# Patient Record
Sex: FEMALE | Race: AMERICAN INDIAN OR ALASKA NATIVE
[De-identification: names, ages, dates, MRNs, and addresses within clinical notes are randomized per-mention and may not be internally consistent; named-entity substitution may affect disease eponyms.]

---

## 2018-01-12 ENCOUNTER — HOSPITAL ENCOUNTER (OUTPATIENT)
Dept: HOSPITAL 5 - SPVWC | Age: 58
Discharge: HOME | End: 2018-01-12
Attending: OBSTETRICS & GYNECOLOGY
Payer: COMMERCIAL

## 2018-01-12 DIAGNOSIS — R92.8: Primary | ICD-10-CM

## 2018-01-12 NOTE — ULTRASOUND REPORT
LEFT DIGITAL DIAGNOSTIC MAMMOGRAM and LEFT BREAST ULTRASOUND: 01/12/18  

 

 09:03:00  

   

 CLINICAL: Recalled for asymmetry.  

   

 COMPARISON:11/07/17 screening  

   

 FINDINGS: ML, exaggerated CC and spot compression MLO views were   

 performed. Near complete effacement of asymmetry on the CC view. The   

 other views are negative.   

   

 Ultrasound of the upper outer left breast was performed and   

 demonstrated normal fibroglandular and fatty structures.  No mass cyst 

  

 or shadowing.  

   

 IMPRESSION: Negative mammogram and negative left breast ultrasound.  

   

 BI-RADS CATEGORY:  1 -- Negative  

   

 RECOMMENDATION: Routine mammographic screening in one year.

## 2018-01-12 NOTE — MAMMOGRAPHY REPORT
LEFT DIGITAL DIAGNOSTIC MAMMOGRAM and LEFT BREAST ULTRASOUND: 01/12/18 

09:03:00



CLINICAL: Recalled for asymmetry.



COMPARISON:11/07/17 screening



FINDINGS: ML, exaggerated CC and spot compression MLO views were 

performed. Near complete effacement of asymmetry on the CC view. The 

other views are negative. 



Ultrasound of the upper outer left breast was performed and 

demonstrated normal fibroglandular and fatty structures.  No mass cyst 

or shadowing.



IMPRESSION: Negative mammogram and negative left breast ultrasound.



BI-RADS CATEGORY:  1 -- Negative



RECOMMENDATION: Routine mammographic screening in one year.



ACR BI-RADS MAMMOGRAPHIC CODES:

0 = Needs additional imaging evaluation; 1 = Negative; 2 = Benign; 3 = 

Probably benign; 4 = Suspicious; 5 = Malignant; 6 = Known biopsy-proven 

malignancy



COMMENT:

      1.   Dense breast tissue, i.e., adenosis, fibrocystic 

            changes, etc., may obscure an underlying neoplasm.

      2.   Approximately 10% of cancers are not detected with

            mammography.

      3.   A negative mammography report should not delay biopsy 

            if a clinically suspicious mass is present.





COMMENT:

Patient follow-up letters are generated via our [employer] 

application.

## 2021-08-28 ENCOUNTER — TELEPHONE ENCOUNTER (OUTPATIENT)
Dept: URBAN - METROPOLITAN AREA CLINIC 13 | Facility: CLINIC | Age: 61
End: 2021-08-28

## 2021-08-29 ENCOUNTER — TELEPHONE ENCOUNTER (OUTPATIENT)
Dept: URBAN - METROPOLITAN AREA CLINIC 13 | Facility: CLINIC | Age: 61
End: 2021-08-29

## 2025-05-15 ENCOUNTER — OFFICE VISIT (OUTPATIENT)
Dept: URBAN - METROPOLITAN AREA CLINIC 118 | Facility: CLINIC | Age: 65
End: 2025-05-15

## 2025-05-19 ENCOUNTER — OFFICE VISIT (OUTPATIENT)
Dept: URBAN - METROPOLITAN AREA CLINIC 118 | Facility: CLINIC | Age: 65
End: 2025-05-19
Payer: COMMERCIAL

## 2025-05-19 ENCOUNTER — LAB OUTSIDE AN ENCOUNTER (OUTPATIENT)
Dept: URBAN - METROPOLITAN AREA CLINIC 118 | Facility: CLINIC | Age: 65
End: 2025-05-19

## 2025-05-19 DIAGNOSIS — R15.9 FREQUENT FECAL INCONTINENCE: ICD-10-CM

## 2025-05-19 DIAGNOSIS — R19.4 CHANGE IN BOWEL HABIT: ICD-10-CM

## 2025-05-19 DIAGNOSIS — Z79.899 MEDICATION MANAGEMENT: ICD-10-CM

## 2025-05-19 PROBLEM — 460671000124103: Status: ACTIVE | Noted: 2025-05-19

## 2025-05-19 PROCEDURE — 99204 OFFICE O/P NEW MOD 45 MIN: CPT | Performed by: INTERNAL MEDICINE

## 2025-05-19 NOTE — PHYSICAL EXAM HENT:
normocephalic, atraumatic, Face within normal limits, Oral cavity appearance normal Pulses equal bilaterally, no edema present.

## 2025-05-19 NOTE — HPI-TODAY'S VISIT:
Patient is a 64 year old female who presents for intermittent fecal incontinence x 5 years. She describes occasional episodes of loose stool in her underwear or small pellets passed unintentionally in the shower without prior awareness. Bowel habits are otherwise regular, with two bowel movements daily. She denies urgency, sense of incomplete evacuation, hematochezia, or unintentional weight loss. She reports following a high fiber diet, but is experiencing constipation since starting Wegovy for pre-diabetes and weight loss ~one month ago. She has lost almost 20#.  Patient with history of 3 vaginal births and one episiotomy. Therefore, she does a lot of kegel exercises. She also c/o early satiety over the past 6 months. Can only eat half of a meal before she feels full and stops eating. No wt loss as a result. Denies nausea, hb, indigestion, or abd pain. Admits significant stress over the past 3 years, which she believes may also be contributing to sxs. Fhx of mother with colon polyps. No colon cancer. No cardiac, lung, or kidney disease. Not on blood thinners.

## 2025-05-20 ENCOUNTER — DASHBOARD ENCOUNTERS (OUTPATIENT)
Age: 65
End: 2025-05-20

## 2025-06-16 ENCOUNTER — OFFICE VISIT (OUTPATIENT)
Dept: URBAN - METROPOLITAN AREA SURGERY CENTER 23 | Facility: SURGERY CENTER | Age: 65
End: 2025-06-16

## 2025-06-16 ENCOUNTER — CLAIMS CREATED FROM THE CLAIM WINDOW (OUTPATIENT)
Dept: URBAN - METROPOLITAN AREA SURGERY CENTER 23 | Facility: SURGERY CENTER | Age: 65
End: 2025-06-16
Payer: COMMERCIAL

## 2025-06-16 DIAGNOSIS — I10 HYPERTENSION: ICD-10-CM

## 2025-06-16 DIAGNOSIS — D12.0 BENIGN NEOPLASM OF CECUM: ICD-10-CM

## 2025-06-16 DIAGNOSIS — R19.4 CHANGE IN BOWEL HABIT: ICD-10-CM

## 2025-06-16 DIAGNOSIS — K63.5 HYPERPLASTIC POLYP OF SIGMOID COLON: ICD-10-CM

## 2025-06-16 PROCEDURE — 00811 ANES LWR INTST NDSC NOS: CPT
